# Patient Record
Sex: MALE | URBAN - METROPOLITAN AREA
[De-identification: names, ages, dates, MRNs, and addresses within clinical notes are randomized per-mention and may not be internally consistent; named-entity substitution may affect disease eponyms.]

---

## 2021-10-06 ENCOUNTER — HISTORICAL (OUTPATIENT)
Dept: ADMINISTRATIVE | Facility: HOSPITAL | Age: 31
End: 2021-10-06

## 2022-04-10 ENCOUNTER — HISTORICAL (OUTPATIENT)
Dept: ADMINISTRATIVE | Facility: HOSPITAL | Age: 32
End: 2022-04-10

## 2022-04-27 VITALS
OXYGEN SATURATION: 99 % | HEIGHT: 64 IN | SYSTOLIC BLOOD PRESSURE: 113 MMHG | DIASTOLIC BLOOD PRESSURE: 81 MMHG | WEIGHT: 154.31 LBS | BODY MASS INDEX: 26.34 KG/M2

## 2022-05-03 NOTE — HISTORICAL [facility] CERNER
This is a historical note converted from [facility]. Formatting and pictures may have been removed.  Please reference [facility] for original formatting and attached multimedia. Chief Complaint  right shoulder dislocation  History of Present Illness  Is a 30-year-old male right-hand-dominant?who works in computer industry at the clinic for evaluation for recurrent right shoulder?instability episodes.? Patient states that?his first dislocation happened several years ago while he was playing [employer], and since then?his?dislocation episodes?have become more?frequent and lower?levels of activity. ?Recently that have been happening while he is asleep. ?Patient states that?he is always able to reduce the shoulder on his own and has never had to go to the emergency department for a formal reduction. ?He has not had any?sort of treatment for this?condition. ?He occasionally wears a?shoulder brace?that helps. ?Denies any issues with contralateral shoulders denies any recent trauma or injury.  Review of Systems  Constitutional:?no fever, fatigue, weakness  Eye:?no vision loss, eye redness, drainage, or pain  ENMT:?no sore throat, ear pain, sinus pain/congestion, nasal congestion/drainage  Respiratory:?no cough, no wheezing, no shortness of breath  Cardiovascular:?no chest pain, no palpitations, no edema  Gastrointestinal:?no nausea, vomiting, or diarrhea. No abdominal pain  ?  ?  Physical Exam  Vitals & Measurements  HT:?163.00?cm? WT:?70.000?kg? BMI:?26.35?  General: Alert awake oriented x3  Respiratory:?Normal work of breathing  Abdominal: Nontender nondistended  Upper extremity:  Shoulder exam?Right?  ?  Symptoms:?chronic?instability  ?  Inspection?  Skin:?Normal?No signs of infection?  Atrophy:?No atrophy?  Warmth:?no?  Erythema:?no?  ?  Palpation?  Tenderness:?No tenderness to palpation  Crepitation:?none?  ?  ROM  Active/passive ?  Flexion ( 0-160):?0 /140  Extension: ( 0-50): 0/40  Abduction: (0-180): 0/140  External: (  0-80): 0/80  Internal: 0/90  ?  ?  Strength?  Elevation: 5/5  ER: 5/5  IR: 5/5  ABD: 5/5  ?  Special tests?  ?  Crossbody abduction: ?negative?  ?  Impingement  Neer:?negative?  Tyson:?negative?  ?  ?  Rotator Cuff?  Hornblower:?negative?  [place]:?negative?  Lift off:?negative?  External rotation lag?negative?  ?  Stability?  Stability examination is limited secondary to patient guarding  No specific relief with anterior pressure  ?  ?  Neurovascular?  RP:?2+?  Sensation?intact distally including axillary  Assessment/Plan  1.?Other instability, right shoulder?M25.311,?Instability of right shoulder joint?M25.311  ?Patient is a 30-year-old male?right-hand-dominant history of?chronic right shoulder instability. ?He has not had any?formal therapy for this condition.  Refer patient to physical therapy for right shoulder range of motion and strengthening. ?Discussed the importance of therapy?to?strengthen?the?dynamic shoulder stabilizers. ?Provided the patient with a?list of exercises to do on his?own at home.  Follow-up in 3 months after?physical therapy  ?   ATTENDING ADDENDUM  ?   Zaida Marion?was evaluated at the time of the encounter with?Dr Mckee.? HPI, PE and treatment plan was reviewed.? Treatment plan was reasonable and necessary.??Imaging was reviewed at the time of visit.??  ?  I had a long discussion patient with his ongoing right shoulder instability. ?He does give a history of increasing frequency of instability events.? He reports this occasions now happening when he is sleeping?or when performing an activity as simple as reaching behind him.? He is unhappy with current status of shoulder.? I did educate the patient that I?believe he would benefit from an operation to restore stability to the right shoulder?however he would require an MRI and possibly CT scan prior to?undertaking any surgical intervention.  ?  In clinic today the patient tells me that he did not have any health insurance. ?He is  concerned about cost of any further imaging?or intervention.? He reports that he cannot afford?any of this treatment.? With that in mind I recommended the patient undergo a?course of therapy/home exercises?while he applies?for health insurance through our facility.? The patient will require extensive PT following surgery.  ?  Ordered:  Clinic Follow up, *Est. 01/06/22 3:00:00 CST, Order for future visit, Instability of right shoulder joint, [facility]  Office/Outpatient Visit Level 4 Established [id number] PC, Instability of right shoulder joint, [facility], 10/06/21 9:58:00 CDT  ?   Medications  No active medications  Diagnostic Results  Independent review of right shoulder radiographs, no acute fracture or dislocation. ?Shoulder is currently reduced on axillary view. ?[place]-Sachs lesion is noted.